# Patient Record
(demographics unavailable — no encounter records)

---

## 2025-01-07 NOTE — HISTORY OF PRESENT ILLNESS
[FreeTextEntry1] : This is a 24 y/o female with a pmhx of syncope, HLD, migraines, preDM here today for a follow up. Last seen 11/21/24 as a new patient reporting chest tightness, syncope and SOB. Patient had echo 12/2024 with normal EF, no significant valvulopathy. EST 12/3/24 was positive for EKG changes, patient was advised for IVELISSE.  Patient continues to reports intermittent Chest pain and SOB. She reports last syncopal episode 2-3 weeks go at work. She lost consciousness, woke up feeling more alert than usual. Patient reports intermittent sweaty palms and redness associated with tingling sensation in fingers. She reports a sensation of heat going up arm, followed by dry mouth and then loss of consciousness Patient reports intermittent migraines, recent migraine a few days ago while on menstrual cycle.  She reports rare palpitations.

## 2025-02-04 NOTE — PHYSICAL EXAM
[de-identified] : General: Awake, alert, no acute distress, Patient was cooperative and appropriate during the examination.  The patient is of normal weight for height and age.  Walks without an antalgic gait.  Bilateral knees examination: Physical examination of the knee demonstrates normal skin without signs of skin changes or abnormalities. No erythema, warmth, or joint effusion is appreciated.   Sensation is intact to light touch L2-S1 Palpable DP/PT pulse EHL/FHL/TA/GSC motor function intact   Range of Motion 0-130 degrees bilaterally    Strength Testing Quadriceps/Hamstring 5/5 bilaterally Patient is able to perform a straight leg raise without difficulty bilaterally   Palpation Not tender to palpation about the distal femur, proximal tibia, or patella bilaterally No palpable defect appreciated in the quadriceps or patellar tendons bilaterally Not tender to palpation of medial joint line bilaterally Not tender to palpation of lateral joint line bilaterally Exquisitely tender to palpation about the distal quadriceps muscles, quadriceps tendons, and patellofemoral compartments   Special Tests Anterior Drawer negative bilaterally  Posterior Drawer negative bilaterally  Lachman Exam negative bilaterally No Varus or Valgus Laxity at 0 or 30 degrees of knee flexion bilaterally Kvng's Test negative for pain or crepitus bilaterally Active compression of the patella negative for pain or crepitus bilaterally Translation of the patella, 2 quadrants with a firm endpoint limited by pain [de-identified] : X-ray 3 views of the bilateral knees taken 2/4/2025 shows no acute fracture or dislocation.  MRIs of the bilateral knees taken previously at a [facility] imaging facility in 2022 revealed patellofemoral syndrome and no other acute findings.  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

## 2025-02-04 NOTE — HISTORY OF PRESENT ILLNESS
[de-identified] : 02/04/2025 : VERNA CHINCHILLA  is a 23 year  old female who presents to the office for evaluation of the bilateral knees.  She states she has had bilateral knee pain for years that is worse certain activities and motions and alleviated with rest.  She was a runner when she was in school but has not been a runner in years.  She states that on and off pain for years that is worse when she sits for long period time, squats, kneels, lunges, goes up and down stairs.  She states the pain is over the anterior and superior aspect of the knee and is very sharp and interfering.  She states when she goes downstairs she gets buckling sensations in the knee and has difficulty straightening the knee after sitting for long periods of time.  She is here for specialist opinion.  Patient was seen by Dr. Heredia a couple of years ago who was concerned that her pain was not orthopedic in origin and recommended evaluation with neurology.  The patient never went for this evaluation.  Of note, the patient's mother also has a history of rheumatoid arthritis and she has never seen a rheumatologist before.  The patient denies any fevers, chills, sweats, recent illnesses, numbness, tingling, weakness, or pain elsewhere at this time.

## 2025-02-04 NOTE — DISCUSSION/SUMMARY
[de-identified] : Assessment: 23-year-old female with bilateral knee patellofemoral syndrome, quad tendinitis  Plan: I had a long discussion with the patient today regarding the nature of their diagnosis and treatment plan. We discussed the risks and benefits of no treatment as well as nonoperative and operative treatments.  I reviewed the x-rays today that showed no acute bony pathology.  I reviewed the previous MRIs that revealed patellofemoral syndrome and no other acute findings.  On examination today the patient has diffuse pain about the distal quadriceps and patellofemoral compartments.  She has not had any recent injury.  At this time I recommend conservative treatment of the patient's condition with modalities including rest, ice, heat, anti-inflammatory medications, activity modifications, and home stretching and strengthening exercises. I discussed with the patient the risks and benefits associated with NSAID use. GI precautions were discussed.  I explained to the patient that some of her symptoms may not be completely orthopedic in nature.  The patient does have a strong family history of rheumatoid arthritis but has never been evaluated for this.  I do recommend evaluation with a rheumatologist to rule this out as a possibility and a referral was provided today.  They will follow-up in 3 months as needed.   The patient verbalizes their understanding and agrees with the plan.  All questions were answered to their satisfaction.   I, Dr. Duran, personally performed the evaluation and management (E/M) services for this new patient.  That E/M includes conducting the clinically appropriate initial history &/or exam, assessing all conditions, and establishing the plan of care.  Today, my IDANIA, was here to observe my evaluation and management service for this patient & follow plan of care established by me going forward.

## 2025-06-27 NOTE — HISTORY OF PRESENT ILLNESS
[FreeTextEntry1] : 23 year F presents for annual visit. c/op dysmenorrhea, n/v during menses, menorrhagia Pt went to ER with last period due to pain.    LMP: 6/3/2025 Menses: 12yo/Irregular/5-7d, heavy SA/Contraception: Yes, men,    OBHx: G0  GYNHx: Irregular menses, ovarian cyst    Health Maintenance: Last Pap - 2023 Gardasil - Complete

## 2025-06-27 NOTE — REVIEW OF SYSTEMS
[Abn Vaginal bleeding] : abnormal vaginal bleeding [Negative] : Heme/Lymph [FreeTextEntry8] : dysmenorrhea